# Patient Record
Sex: FEMALE | Race: WHITE | NOT HISPANIC OR LATINO | URBAN - METROPOLITAN AREA
[De-identification: names, ages, dates, MRNs, and addresses within clinical notes are randomized per-mention and may not be internally consistent; named-entity substitution may affect disease eponyms.]

---

## 2021-05-14 ENCOUNTER — OFFICE VISIT (OUTPATIENT)
Dept: URBAN - METROPOLITAN AREA CLINIC 63 | Facility: CLINIC | Age: 56
End: 2021-05-14

## 2021-06-16 ENCOUNTER — OFFICE VISIT (OUTPATIENT)
Dept: URBAN - METROPOLITAN AREA SURGERY CENTER 4 | Facility: SURGERY CENTER | Age: 56
End: 2021-06-16

## 2021-06-17 LAB — PATHOLOGY (INDENTED REPORT): (no result)

## 2021-07-02 ENCOUNTER — OFFICE VISIT (OUTPATIENT)
Dept: URBAN - METROPOLITAN AREA CLINIC 63 | Facility: CLINIC | Age: 56
End: 2021-07-02

## 2021-10-01 ENCOUNTER — OFFICE VISIT (OUTPATIENT)
Dept: URBAN - METROPOLITAN AREA CLINIC 63 | Facility: CLINIC | Age: 56
End: 2021-10-01

## 2022-02-08 ENCOUNTER — OFFICE VISIT (OUTPATIENT)
Dept: URBAN - METROPOLITAN AREA CLINIC 60 | Facility: CLINIC | Age: 57
End: 2022-02-08

## 2022-07-09 ENCOUNTER — TELEPHONE ENCOUNTER (OUTPATIENT)
Dept: URBAN - METROPOLITAN AREA CLINIC 121 | Facility: CLINIC | Age: 57
End: 2022-07-09

## 2022-07-09 RX ORDER — LEVOTHYROXINE SODIUM 112 MCG
TABLET ORAL ONCE A DAY
Refills: 0 | OUTPATIENT
Start: 2021-07-02 | End: 2021-10-01

## 2022-07-09 RX ORDER — LINACLOTIDE 145 UG/1
CAPSULE, GELATIN COATED ORAL
Refills: 0 | OUTPATIENT
Start: 2018-02-21 | End: 2021-05-14

## 2022-07-09 RX ORDER — LEVOTHYROXINE SODIUM 112 MCG
TABLET ORAL ONCE A DAY
Refills: 0 | OUTPATIENT
Start: 2021-10-01 | End: 2022-02-08

## 2022-07-09 RX ORDER — OMEPRAZOLE 40 MG/1
ONCE A DAY CAPSULE, DELAYED RELEASE ORAL ONCE A DAY
Refills: 3 | OUTPATIENT
Start: 2021-05-14 | End: 2022-02-08

## 2022-07-09 RX ORDER — OMEPRAZOLE 20 MG/1
TAKE ONE CAPSULE BY MOUTH ONE TIME DAILY 30 MINUTES PRIOR TO MEAL CAPSULE, DELAYED RELEASE ORAL
Refills: 5 | OUTPATIENT
Start: 2017-09-29 | End: 2018-02-21

## 2022-07-09 RX ORDER — OMEPRAZOLE 20 MG/1
TAKE ONE CAPSULE BY MOUTH ONE TIME DAILY 30 MINUTES PRIOR TO MEAL CAPSULE, DELAYED RELEASE ORAL
Refills: 0 | OUTPATIENT
Start: 2017-08-22 | End: 2017-09-29

## 2022-07-09 RX ORDER — LINACLOTIDE 290 UG/1
ONCE A DAY CAPSULE, GELATIN COATED ORAL ONCE A DAY
Refills: 3 | OUTPATIENT
Start: 2021-10-01 | End: 2022-03-18

## 2022-07-09 RX ORDER — LEVOTHYROXINE SODIUM 50 UG/1
TABLET ORAL
Refills: 0 | OUTPATIENT
Start: 2018-02-21 | End: 2021-05-14

## 2022-07-09 RX ORDER — TIZANIDINE 2 MG/1
TABLET ORAL
Refills: 0 | OUTPATIENT
Start: 2021-01-21 | End: 2021-10-01

## 2022-07-09 RX ORDER — ATORVASTATIN CALCIUM 20 MG/1
TABLET, FILM COATED ORAL ONCE A DAY
Refills: 0 | OUTPATIENT
Start: 2021-10-01 | End: 2021-10-01

## 2022-07-09 RX ORDER — CYCLOSPORINE 0.5 MG/ML
EMULSION OPHTHALMIC TWICE A DAY
Refills: 0 | OUTPATIENT
Start: 2021-06-30 | End: 2021-10-01

## 2022-07-09 RX ORDER — TIZANIDINE 2 MG/1
TABLET ORAL
Refills: 0 | OUTPATIENT
Start: 2021-10-01 | End: 2021-10-01

## 2022-07-09 RX ORDER — LINACLOTIDE 145 UG/1
ONCE A DAY CAPSULE, GELATIN COATED ORAL ONCE A DAY
Refills: 1 | OUTPATIENT
Start: 2017-11-01 | End: 2018-02-21

## 2022-07-09 RX ORDER — ZOLPIDEM TARTRATE 5 MG/1
TABLET, FILM COATED ORAL
Refills: 0 | OUTPATIENT
Start: 2017-04-13 | End: 2017-08-10

## 2022-07-09 RX ORDER — ZOLPIDEM TARTRATE 5 MG/1
TABLET, FILM COATED ORAL
Refills: 0 | OUTPATIENT
Start: 2017-08-10 | End: 2018-02-21

## 2022-07-09 RX ORDER — CYCLOSPORINE 0.5 MG/ML
EMULSION OPHTHALMIC TWICE A DAY
Refills: 0 | OUTPATIENT
Start: 2021-10-01 | End: 2021-10-01

## 2022-07-09 RX ORDER — ATORVASTATIN CALCIUM 20 MG/1
TABLET, FILM COATED ORAL ONCE A DAY
Refills: 0 | OUTPATIENT
Start: 2020-08-20 | End: 2021-10-01

## 2022-07-09 RX ORDER — MELOXICAM 15 MG/1
TABLET ORAL ONCE A DAY
Refills: 0 | OUTPATIENT
Start: 2021-10-01 | End: 2021-10-01

## 2022-07-09 RX ORDER — OMEPRAZOLE 40 MG/1
ONCE A DAY CAPSULE, DELAYED RELEASE ORAL ONCE A DAY
Refills: 2 | OUTPATIENT
Start: 2022-02-08 | End: 2022-05-16

## 2022-07-09 RX ORDER — LEVOTHYROXINE SODIUM 88 MCG
TABLET ORAL
Refills: 0 | OUTPATIENT
Start: 2021-12-15 | End: 2022-02-08

## 2022-07-09 RX ORDER — SIMVASTATIN 10 MG/1
TABLET, FILM COATED ORAL
Refills: 0 | OUTPATIENT
Start: 2017-04-13 | End: 2017-08-10

## 2022-07-09 RX ORDER — OMEPRAZOLE 20 MG/1
CAPSULE, DELAYED RELEASE ORAL
Refills: 0 | OUTPATIENT
Start: 2018-02-21 | End: 2021-05-14

## 2022-07-09 RX ORDER — LEVOTHYROXINE SODIUM 112 MCG
TABLET ORAL ONCE A DAY
Refills: 0 | OUTPATIENT
Start: 2022-02-08 | End: 2022-02-08

## 2022-07-09 RX ORDER — LINACLOTIDE 145 UG/1
ONCE A DAY CAPSULE, GELATIN COATED ORAL ONCE A DAY
Refills: 3 | OUTPATIENT
Start: 2017-05-08 | End: 2017-08-10

## 2022-07-09 RX ORDER — LINACLOTIDE 145 UG/1
ONCE A DAY CAPSULE, GELATIN COATED ORAL ONCE A DAY
Refills: 3 | OUTPATIENT
Start: 2017-08-10 | End: 2017-11-01

## 2022-07-09 RX ORDER — LEVOTHYROXINE SODIUM 112 MCG
TABLET ORAL ONCE A DAY
Refills: 0 | OUTPATIENT
Start: 2021-05-14 | End: 2021-07-02

## 2022-07-09 RX ORDER — MV-MIN/FOLIC/VIT K/LYCOP/COQ10 200-100MCG
(BRAND) RAE MULTIVTIAMIN CAPSULE ORAL ONCE A DAY
Refills: 0 | OUTPATIENT
Start: 2021-10-01 | End: 2022-02-08

## 2022-07-09 RX ORDER — LEVOTHYROXINE SODIUM 88 MCG
TABLET ORAL ONCE A DAY
Refills: 0 | OUTPATIENT
Start: 2017-08-10 | End: 2018-02-21

## 2022-07-10 ENCOUNTER — TELEPHONE ENCOUNTER (OUTPATIENT)
Dept: URBAN - METROPOLITAN AREA CLINIC 121 | Facility: CLINIC | Age: 57
End: 2022-07-10

## 2022-07-10 RX ORDER — LINACLOTIDE 145 UG/1
ONCE A DAY CAPSULE, GELATIN COATED ORAL ONCE A DAY
Refills: 4 | Status: ACTIVE | COMMUNITY
Start: 2021-05-14

## 2022-07-10 RX ORDER — OMEPRAZOLE 20 MG/1
ONCE A DAY 30 MIN PRIOR TO MEAL CAPSULE, DELAYED RELEASE ORAL ONCE A DAY
Refills: 0 | Status: ACTIVE | COMMUNITY
Start: 2017-05-08

## 2022-07-10 RX ORDER — MV-MIN/FOLIC/VIT K/LYCOP/COQ10 200-100MCG
(BRAND) RAE MULTIVTIAMIN CAPSULE ORAL ONCE A DAY
Refills: 0 | Status: ACTIVE | COMMUNITY
Start: 2022-02-08

## 2022-07-10 RX ORDER — FAMOTIDINE 40 MG/1
TWICE A DAY TABLET, FILM COATED ORAL TWICE A DAY
Refills: 0 | Status: ACTIVE | COMMUNITY
Start: 2022-02-08

## 2022-07-10 RX ORDER — LEVOTHYROXINE SODIUM 88 MCG
TABLET ORAL
Refills: 0 | Status: ACTIVE | COMMUNITY
Start: 2022-02-08

## 2022-07-10 RX ORDER — LINACLOTIDE 290 UG/1
TAKE ONE CAPSULE BY MOUTH ONE TIME DAILY CAPSULE, GELATIN COATED ORAL
Refills: 3 | Status: ACTIVE | COMMUNITY
Start: 2022-03-29

## 2022-07-10 RX ORDER — LINACLOTIDE 290 UG/1
TAKE ONE CAPSULE BY MOUTH ONE TIME DAILY CAPSULE, GELATIN COATED ORAL
Refills: 3 | Status: ACTIVE | COMMUNITY
Start: 2022-03-18

## 2022-07-10 RX ORDER — OMEPRAZOLE 40 MG/1
TAKE ONE CAPSULE BY MOUTH ONE TIME DAILY CAPSULE, DELAYED RELEASE ORAL
Refills: 2 | Status: ACTIVE | COMMUNITY
Start: 2022-05-16

## 2022-07-19 ENCOUNTER — TELEPHONE ENCOUNTER (OUTPATIENT)
Dept: URBAN - METROPOLITAN AREA CLINIC 63 | Facility: CLINIC | Age: 57
End: 2022-07-19

## 2022-07-19 RX ORDER — LINACLOTIDE 290 UG/1
TAKE ONE CAPSULE BY MOUTH ONE TIME DAILY CAPSULE, GELATIN COATED ORAL ONCE A DAY
Qty: 90 | Refills: 0

## 2022-07-29 ENCOUNTER — ERX REFILL RESPONSE (OUTPATIENT)
Dept: URBAN - METROPOLITAN AREA CLINIC 63 | Facility: CLINIC | Age: 57
End: 2022-07-29

## 2022-07-29 RX ORDER — LINACLOTIDE 290 UG/1
TAKE ONE CAPSULE BY MOUTH ONE TIME DAILY CAPSULE, GELATIN COATED ORAL ONCE A DAY
Qty: 90 | Refills: 0 | OUTPATIENT

## 2022-07-29 RX ORDER — LINACLOTIDE 290 UG/1
TAKE ONE CAPSULE BY MOUTH ONE TIME DAILY CAPSULE, GELATIN COATED ORAL
Qty: 30 CAPSULE | Refills: 3 | OUTPATIENT

## 2023-01-05 PROBLEM — 235595009 GASTROESOPHAGEAL REFLUX DISEASE: Status: ACTIVE | Noted: 2023-01-05

## 2023-01-05 PROBLEM — 422801000 SCLERODERMA: Status: ACTIVE | Noted: 2023-01-05

## 2023-01-05 PROBLEM — 428283002 HISTORY OF POLYP OF COLON: Status: ACTIVE | Noted: 2023-01-05

## 2023-01-06 ENCOUNTER — DASHBOARD ENCOUNTERS (OUTPATIENT)
Age: 58
End: 2023-01-06

## 2023-01-06 ENCOUNTER — OFFICE VISIT (OUTPATIENT)
Dept: URBAN - METROPOLITAN AREA CLINIC 63 | Facility: CLINIC | Age: 58
End: 2023-01-06
Payer: MEDICARE

## 2023-01-06 VITALS
BODY MASS INDEX: 29.52 KG/M2 | SYSTOLIC BLOOD PRESSURE: 122 MMHG | HEIGHT: 62 IN | OXYGEN SATURATION: 98 % | DIASTOLIC BLOOD PRESSURE: 68 MMHG | WEIGHT: 160.4 LBS | TEMPERATURE: 97.8 F | HEART RATE: 72 BPM

## 2023-01-06 DIAGNOSIS — Z12.11 SCREEN FOR COLON CANCER: ICD-10-CM

## 2023-01-06 DIAGNOSIS — K21.9 GERD: ICD-10-CM

## 2023-01-06 DIAGNOSIS — Z86.010 PERSONAL HISTORY OF COLON POLYPS: ICD-10-CM

## 2023-01-06 DIAGNOSIS — M34.9 SCLERODERMA: ICD-10-CM

## 2023-01-06 PROCEDURE — 99213 OFFICE O/P EST LOW 20 MIN: CPT | Performed by: INTERNAL MEDICINE

## 2023-01-06 RX ORDER — LEVOTHYROXINE SODIUM 88 MCG
TABLET ORAL
Refills: 0 | Status: ACTIVE | COMMUNITY
Start: 2022-02-08

## 2023-01-06 RX ORDER — FAMOTIDINE 40 MG/1
TWICE A DAY TABLET, FILM COATED ORAL TWICE A DAY
Refills: 0 | Status: ON HOLD | COMMUNITY
Start: 2022-02-08

## 2023-01-06 RX ORDER — OMEPRAZOLE 40 MG/1
TAKE ONE CAPSULE BY MOUTH ONE TIME DAILY CAPSULE, DELAYED RELEASE ORAL
Refills: 2 | Status: ON HOLD | COMMUNITY
Start: 2022-05-16

## 2023-01-06 RX ORDER — OMEPRAZOLE 20 MG/1
ONCE A DAY 30 MIN PRIOR TO MEAL CAPSULE, DELAYED RELEASE ORAL ONCE A DAY
Refills: 0 | Status: ON HOLD | COMMUNITY
Start: 2017-05-08

## 2023-01-06 RX ORDER — MV-MIN/FOLIC/VIT K/LYCOP/COQ10 200-100MCG
(BRAND) RAE MULTIVTIAMIN CAPSULE ORAL ONCE A DAY
Refills: 0 | Status: ON HOLD | COMMUNITY
Start: 2022-02-08

## 2023-01-06 RX ORDER — LINACLOTIDE 290 UG/1
TAKE ONE CAPSULE BY MOUTH ONE TIME DAILY CAPSULE, GELATIN COATED ORAL
Qty: 30 CAPSULE | Refills: 3 | Status: ACTIVE | COMMUNITY

## 2023-01-06 NOTE — HPI-PREVIOUS IMAGING
Barium swallow May 2021 unremarkable PIPIDA scan June 2021: 75% gallbladder ejection fraction, delayed visualization of the small bowel Ultrasound April 2017 normal gallbladder, normal CBD, normal liver SIBO testing July 2020-unremarkable Barium swallow 2017: No hiatal hernia, barium tablet passes without hindrance, severe bouts of coughing during the procedure without aspiration or nasopharyngeal regurgitation. Small bowel follow-through negative in 2018

## 2023-01-06 NOTE — HPI-PREVIOUS LABS
Labs August 2021: Hemoglobin 12.5 g, normal platelets, BUN 10 creatinine 0.64, normal liver enzymes, SARS-CoV-2 negative Labs April 2021 hemoglobin 11.4 g, ESR 15, creatinine 0.77, C-reactive protein 3, double-stranded DNA antibody 2, normal complements, antiscleroderma-70 antibodies negative, centromere B antibody negative, anti-Taryn antibody negative, TPMT activity 17

## 2023-01-06 NOTE — HPI-TODAY'S VISIT:
Stephanie is a pleasant 57-year-old female has history of SCLERODERMA  chronic constipation for which she takes probiotics.  She past failed a trial of Linzess 290 mcg.  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 Grace Wiseman NO LONGER ON A  vegan diet. Upper endoscopy and colonoscopy undertaken in 2017-refer below. Unremarkable gallbladder evaluation with a unremarkable ultrasound and PIPIDA scan.  Unremarkable SIBO testing. DOES NOT SMOKE OR DRINK ALCOHOL - 2 MARIJUANA GUMMIES AT NIGHT TO HELP HER SLEEP Stephanie is here today in follow-up.  She is asymptomatic at this time.  Reports no reflux, dysphagia early satiety or bloating.  Denies any abdominal pain or nausea.  Denies any constipation diarrhea rectal bleeding melena weight loss loss of appetite.

## 2023-01-06 NOTE — HPI-PREVIOUS PROCEDURES
Upper endoscopy June 2021: Mildly dilated and patulous mid esophagus, nonobstructing Schatzki's ring dilated with a 54 [place] Pena, 2 cm hiatal hernia, normal gastric body and antrum.  Normal duodenum previously biopsied to show no H. pylori or villous blunting. Colonoscopy June 2021: Procedure was tolerated well, good bowel prep, normal terminal ileum, 4 mm polyp in the transverse colon x2-tubular adenomas, one diminutive polyp in the rectum-serrated polyp/adenoma, grade 3 hemorrhoids  Colonoscopy April 2017: Good prep, 5 mm and 8 mm polyps in the rectosigmoid junction-pathology tubular adenoma/hyperplastic, 5 mm transverse colon benign colonic mucosa, 5 mm polyp in the ascending colon-path tubular adenoma, grade 2 hemorrhoids Upper endoscopy 2017: Esophageal mucosa suspicious for EOE Path negative for intestinal metaplasia or Candida or EOE.  Acute gastritis biopsies negative for H. pylori, normal duodenum with unremarkable biopsies EGD with dilation Dr. Echevarria Colonoscopy-Dr. Echevarria

## 2024-10-10 ENCOUNTER — OFFICE VISIT (OUTPATIENT)
Dept: URBAN - METROPOLITAN AREA CLINIC 63 | Facility: CLINIC | Age: 59
End: 2024-10-10

## 2024-11-07 ENCOUNTER — TELEPHONE ENCOUNTER (OUTPATIENT)
Dept: URBAN - METROPOLITAN AREA CLINIC 63 | Facility: CLINIC | Age: 59
End: 2024-11-07

## 2024-11-08 ENCOUNTER — OFFICE VISIT (OUTPATIENT)
Dept: URBAN - METROPOLITAN AREA CLINIC 63 | Facility: CLINIC | Age: 59
End: 2024-11-08

## 2024-11-08 RX ORDER — FAMOTIDINE 40 MG/1
TWICE A DAY TABLET, FILM COATED ORAL TWICE A DAY
Refills: 0 | COMMUNITY
Start: 2022-02-08

## 2024-11-08 RX ORDER — LEVOTHYROXINE SODIUM 88 MCG
TABLET ORAL
Refills: 0 | COMMUNITY
Start: 2022-02-08

## 2024-11-08 RX ORDER — LINACLOTIDE 290 UG/1
TAKE ONE CAPSULE BY MOUTH ONE TIME DAILY CAPSULE, GELATIN COATED ORAL
Qty: 30 CAPSULE | Refills: 3 | COMMUNITY

## 2024-11-08 RX ORDER — MV-MIN/FOLIC/VIT K/LYCOP/COQ10 200-100MCG
(BRAND) RAE MULTIVTIAMIN CAPSULE ORAL ONCE A DAY
Refills: 0 | COMMUNITY
Start: 2022-02-08

## 2024-11-08 RX ORDER — OMEPRAZOLE 40 MG/1
TAKE ONE CAPSULE BY MOUTH ONE TIME DAILY CAPSULE, DELAYED RELEASE ORAL
Refills: 2 | COMMUNITY
Start: 2022-05-16

## 2024-12-12 ENCOUNTER — OFFICE VISIT (OUTPATIENT)
Dept: URBAN - METROPOLITAN AREA CLINIC 63 | Facility: CLINIC | Age: 59
End: 2024-12-12

## 2024-12-13 ENCOUNTER — OFFICE VISIT (OUTPATIENT)
Dept: URBAN - METROPOLITAN AREA CLINIC 63 | Facility: CLINIC | Age: 59
End: 2024-12-13